# Patient Record
Sex: MALE | Race: WHITE | NOT HISPANIC OR LATINO | Employment: FULL TIME | URBAN - METROPOLITAN AREA
[De-identification: names, ages, dates, MRNs, and addresses within clinical notes are randomized per-mention and may not be internally consistent; named-entity substitution may affect disease eponyms.]

---

## 2022-04-10 ENCOUNTER — HISTORICAL (OUTPATIENT)
Dept: ADMINISTRATIVE | Facility: HOSPITAL | Age: 27
End: 2022-04-10

## 2022-04-26 VITALS
BODY MASS INDEX: 24.42 KG/M2 | SYSTOLIC BLOOD PRESSURE: 136 MMHG | DIASTOLIC BLOOD PRESSURE: 82 MMHG | WEIGHT: 164.88 LBS | OXYGEN SATURATION: 98 % | HEIGHT: 69 IN

## 2022-12-11 ENCOUNTER — OFFICE VISIT (OUTPATIENT)
Dept: URGENT CARE | Facility: CLINIC | Age: 27
End: 2022-12-11
Payer: COMMERCIAL

## 2022-12-11 VITALS
HEIGHT: 70 IN | BODY MASS INDEX: 23.62 KG/M2 | WEIGHT: 165 LBS | TEMPERATURE: 99 F | DIASTOLIC BLOOD PRESSURE: 80 MMHG | RESPIRATION RATE: 17 BRPM | SYSTOLIC BLOOD PRESSURE: 131 MMHG | OXYGEN SATURATION: 100 % | HEART RATE: 86 BPM

## 2022-12-11 DIAGNOSIS — J01.40 ACUTE NON-RECURRENT PANSINUSITIS: Primary | ICD-10-CM

## 2022-12-11 PROCEDURE — 3075F PR MOST RECENT SYSTOLIC BLOOD PRESS GE 130-139MM HG: ICD-10-PCS | Mod: CPTII,,, | Performed by: FAMILY MEDICINE

## 2022-12-11 PROCEDURE — 3079F PR MOST RECENT DIASTOLIC BLOOD PRESSURE 80-89 MM HG: ICD-10-PCS | Mod: CPTII,,, | Performed by: FAMILY MEDICINE

## 2022-12-11 PROCEDURE — 99213 PR OFFICE/OUTPT VISIT, EST, LEVL III, 20-29 MIN: ICD-10-PCS | Mod: ,,, | Performed by: FAMILY MEDICINE

## 2022-12-11 PROCEDURE — 1160F PR REVIEW ALL MEDS BY PRESCRIBER/CLIN PHARMACIST DOCUMENTED: ICD-10-PCS | Mod: CPTII,,, | Performed by: FAMILY MEDICINE

## 2022-12-11 PROCEDURE — 3008F BODY MASS INDEX DOCD: CPT | Mod: CPTII,,, | Performed by: FAMILY MEDICINE

## 2022-12-11 PROCEDURE — 99213 OFFICE O/P EST LOW 20 MIN: CPT | Mod: ,,, | Performed by: FAMILY MEDICINE

## 2022-12-11 PROCEDURE — 1159F PR MEDICATION LIST DOCUMENTED IN MEDICAL RECORD: ICD-10-PCS | Mod: CPTII,,, | Performed by: FAMILY MEDICINE

## 2022-12-11 PROCEDURE — 1160F RVW MEDS BY RX/DR IN RCRD: CPT | Mod: CPTII,,, | Performed by: FAMILY MEDICINE

## 2022-12-11 PROCEDURE — 3075F SYST BP GE 130 - 139MM HG: CPT | Mod: CPTII,,, | Performed by: FAMILY MEDICINE

## 2022-12-11 PROCEDURE — 3079F DIAST BP 80-89 MM HG: CPT | Mod: CPTII,,, | Performed by: FAMILY MEDICINE

## 2022-12-11 PROCEDURE — 1159F MED LIST DOCD IN RCRD: CPT | Mod: CPTII,,, | Performed by: FAMILY MEDICINE

## 2022-12-11 PROCEDURE — 3008F PR BODY MASS INDEX (BMI) DOCUMENTED: ICD-10-PCS | Mod: CPTII,,, | Performed by: FAMILY MEDICINE

## 2022-12-11 RX ORDER — PREDNISONE 20 MG/1
TABLET ORAL
Qty: 8 TABLET | Refills: 0 | Status: SHIPPED | OUTPATIENT
Start: 2022-12-11

## 2022-12-11 RX ORDER — AZITHROMYCIN 250 MG/1
TABLET, FILM COATED ORAL
Qty: 6 TABLET | Refills: 0 | Status: SHIPPED | OUTPATIENT
Start: 2022-12-11

## 2022-12-11 NOTE — PROGRESS NOTES
"Subjective:       Patient ID: Colt Shetty is a 27 y.o. male.    Vitals:  height is 5' 10" (1.778 m) and weight is 74.8 kg (165 lb). His temperature is 98.9 °F (37.2 °C). His blood pressure is 131/80 and his pulse is 86. His respiration is 17 and oxygen saturation is 100%.     Chief Complaint: Cough (X 5 days: Cough, congestion, fatigue /Fever x last night - at home covid test: last night: negative )    HPI:  27-year-old healthy male present to clinic with concerns of nasal congestion, sinus congestion, coughing and fever.  States symptoms started 2 weeks ago and self diagnosis of influenza and over-the-counter medications some help.  Felt better for few days and started with nasal congestion, sinus congestion with green brown mucus since 5 days.  Gradual in onset and worsening.  New onset low-grade fever of 100.3 last night.  No concerns of positive exposure to infections.  Home COVID-19 test has been negative in 2 weeks.  Last and was last night     ROS  :  Constitutional :  Fever, headache, no body aches or chills  Neck : Negative except HPI  HENT : As per HPI  Respiratory : No shortness of breath, no wheezing  Cardiovascular : No chest pain  Musculoskeletal : Negative except HPI  Integumentary : No rash, no abnormal lesion  Neurological : Negative for tingling numbness and weakness   Objective:      Physical Exam    General : Alert and Oriented, No apparent distress, afebrile, sounds stuffy and congested  Neck - supple  HENT : Oropharynx no redness or swelling. Tonsils not enlarged, no exudate.  Bilateral TM intact, left TM appears mild redness and moderate fluid.  Right TM moderate fluid no redness.   Respiratory : Bilateral equal breath sounds, nonlabored respirations  Cardiovascular : Rate, rhythm regular, normal volume pulse, no murmur  Integumentary : Warm, Dry and no rash    Assessment:       1. Acute non-recurrent pansinusitis          Plan:       Cool mist vaporizer to keep there was moist and help " draining sinuses.  Claritin or Allegra for congestion.  Medications as directed.  Prednisone for symptom relief.  Tylenol ibuprofen for sinus headache.  Call or return to clinic for any questions.    Acute non-recurrent pansinusitis  -     predniSONE (DELTASONE) 20 MG tablet; One tablet orally twice daily for 3 days and then once daily for 2 days  Dispense: 8 tablet; Refill: 0  -     azithromycin (Z-HANNAH) 250 MG tablet; Take 2 tablets by mouth on day 1; Take 1 tablet by mouth on days 2-5  Dispense: 6 tablet; Refill: 0

## 2022-12-11 NOTE — PATIENT INSTRUCTIONS
Cool mist vaporizer to keep there was moist and help draining sinuses.  Claritin or Allegra for congestion.  Medications as directed.  Prednisone for symptom relief.  Tylenol ibuprofen for sinus headache.  Call or return to clinic for any questions.

## 2022-12-17 ENCOUNTER — HOSPITAL ENCOUNTER (EMERGENCY)
Facility: HOSPITAL | Age: 27
Discharge: HOME | End: 2022-12-17
Attending: STUDENT IN AN ORGANIZED HEALTH CARE EDUCATION/TRAINING PROGRAM
Payer: COMMERCIAL

## 2022-12-17 VITALS
HEIGHT: 70 IN | WEIGHT: 165 LBS | HEART RATE: 83 BPM | DIASTOLIC BLOOD PRESSURE: 88 MMHG | BODY MASS INDEX: 23.62 KG/M2 | OXYGEN SATURATION: 100 % | RESPIRATION RATE: 19 BRPM | TEMPERATURE: 97 F | SYSTOLIC BLOOD PRESSURE: 147 MMHG
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
## 2022-12-17 PROCEDURE — 99285 EMERGENCY DEPT VISIT HI MDM: CPT | Mod: 25

## 2022-12-17 PROCEDURE — 96372 THER/PROPH/DIAG INJ SC/IM: CPT | Performed by: NURSE PRACTITIONER

## 2022-12-17 PROCEDURE — 63600175 PHARM REV CODE 636 W HCPCS: Performed by: NURSE PRACTITIONER

## 2022-12-17 RX ORDER — KETOROLAC TROMETHAMINE 30 MG/ML
60 INJECTION, SOLUTION INTRAMUSCULAR; INTRAVENOUS
Status: COMPLETED | OUTPATIENT
Start: 2022-12-17 | End: 2022-12-17

## 2022-12-17 RX ORDER — CYCLOBENZAPRINE HCL 10 MG
5 TABLET ORAL 3 TIMES DAILY PRN
Qty: 8 TABLET | Refills: 0 | Status: SHIPPED | OUTPATIENT
Start: 2022-12-17 | End: 2022-12-22

## 2022-12-17 RX ORDER — HYDROCODONE BITARTRATE AND ACETAMINOPHEN 5; 325 MG/1; MG/1
1 TABLET ORAL EVERY 6 HOURS PRN
Qty: 12 TABLET | Refills: 0 | Status: SHIPPED | OUTPATIENT
Start: 2022-12-17 | End: 2022-12-29

## 2022-12-17 RX ORDER — HYDROCODONE BITARTRATE AND ACETAMINOPHEN 5; 325 MG/1; MG/1
1 TABLET ORAL EVERY 6 HOURS PRN
Qty: 12 TABLET | Refills: 0 | Status: SHIPPED | OUTPATIENT
Start: 2022-12-17 | End: 2022-12-17 | Stop reason: SDUPTHER

## 2022-12-17 RX ADMIN — KETOROLAC TROMETHAMINE 60 MG: 30 INJECTION, SOLUTION INTRAMUSCULAR at 07:12
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
## 2023-09-19 ENCOUNTER — OFFICE VISIT (OUTPATIENT)
Dept: URGENT CARE | Facility: CLINIC | Age: 28
End: 2023-09-19
Payer: COMMERCIAL

## 2023-09-19 VITALS
RESPIRATION RATE: 20 BRPM | HEIGHT: 70 IN | HEART RATE: 71 BPM | OXYGEN SATURATION: 100 % | DIASTOLIC BLOOD PRESSURE: 95 MMHG | SYSTOLIC BLOOD PRESSURE: 148 MMHG | WEIGHT: 160 LBS | TEMPERATURE: 98 F | BODY MASS INDEX: 22.9 KG/M2

## 2023-09-19 DIAGNOSIS — R10.31 RIGHT LOWER QUADRANT PAIN: Primary | ICD-10-CM

## 2023-09-19 LAB
BILIRUB UR QL STRIP: NEGATIVE
GLUCOSE UR QL STRIP: NEGATIVE
KETONES UR QL STRIP: NEGATIVE
LEUKOCYTE ESTERASE UR QL STRIP: NEGATIVE
PH, POC UA: 5
POC BLOOD, URINE: NEGATIVE
POC NITRATES, URINE: NEGATIVE
PROT UR QL STRIP: NEGATIVE
SP GR UR STRIP: 1.03 (ref 1–1.03)
UROBILINOGEN UR STRIP-ACNC: 0.3 (ref 0.3–2.2)

## 2023-09-19 PROCEDURE — 99213 OFFICE O/P EST LOW 20 MIN: CPT | Mod: ,,, | Performed by: FAMILY MEDICINE

## 2023-09-19 PROCEDURE — 81003 POCT URINALYSIS, DIPSTICK, AUTOMATED, W/O SCOPE: ICD-10-PCS | Mod: QW,,, | Performed by: FAMILY MEDICINE

## 2023-09-19 PROCEDURE — 81003 URINALYSIS AUTO W/O SCOPE: CPT | Mod: QW,,, | Performed by: FAMILY MEDICINE

## 2023-09-19 PROCEDURE — 99213 PR OFFICE/OUTPT VISIT, EST, LEVL III, 20-29 MIN: ICD-10-PCS | Mod: ,,, | Performed by: FAMILY MEDICINE

## 2023-09-19 NOTE — PROGRESS NOTES
Patient ID: [id number]     Chief Complaint: upper respiratory tract infection symptoms    History of Present Illness:     Colt Shetty is a 28 y.o. male  who presents today for symptoms of Abdominal Pain (Lower right sided abdominal pain, started last night. Denies nausea or any other symptoms)    28-year-old male presents with a 1 day history of right lower quadrant pain.  Yesterday the pain occurred only when he attempted to change positions that involve moving his torso.  Overnight and this morning he began having a baseline achy pain with intermittent sharp pains in the right lower quadrant that are unrelated to movement or body position that appear to occur randomly and lasts for 10-15 seconds before going away entirely.  No fevers, diarrhea, constipation, nausea, or vomiting.  Last bowel movement was this morning which was normal, as well as a normal when yesterday.  No systemic symptoms, no other symptoms.  Pain is a who out of 10 baseline with intermittent sharp pains that go up to 5 or 6.       Past Medical History:     ----------------------------  Asthma     Past Surgical History:   Procedure Laterality Date    WISDOM TOOTH EXTRACTION         Review of patient's allergies indicates:   Allergen Reactions    Loracarbef     Penicillin g benzathine        No outpatient medications have been marked as taking for the 9/19/23 encounter (Office Visit) with Eliot Taveras MD.       Social History     Socioeconomic History    Marital status: Single   Tobacco Use    Smoking status: Some Days     Types: Vaping with nicotine    Smokeless tobacco: Never   Substance and Sexual Activity    Alcohol use: Not Currently     Comment: occ    Drug use: Never        Family History   Problem Relation Age of Onset    No Known Problems Mother     Hypertension Father     No Known Problems Sister     No Known Problems Brother     No Known Problems Other         Subjective:     Review of Systems   Constitutional:  Negative  for chills, fever and malaise/fatigue.   Gastrointestinal:  Positive for abdominal pain. Negative for blood in stool, constipation, diarrhea, heartburn, melena, nausea and vomiting.   Musculoskeletal:  Negative for back pain and falls.   Neurological:  Negative for dizziness, tingling, sensory change, speech change, focal weakness, seizures and loss of consciousness.       Objective:     Vitals:    09/19/23 1317   BP: (!) 148/95   Pulse:    Resp:    Temp:      Body mass index is 22.96 kg/m².    Physical Exam  Constitutional:       General: He is not in acute distress.     Appearance: Normal appearance. He is normal weight. He is not ill-appearing or toxic-appearing.   HENT:      Head: Normocephalic and atraumatic.      Nose: No congestion or rhinorrhea.      Mouth/Throat:      Pharynx: Oropharynx is clear. No oropharyngeal exudate or posterior oropharyngeal erythema.   Eyes:      General:         Right eye: No discharge.         Left eye: No discharge.      Extraocular Movements: Extraocular movements intact.      Conjunctiva/sclera: Conjunctivae normal.   Cardiovascular:      Rate and Rhythm: Normal rate and regular rhythm.      Heart sounds: Normal heart sounds. No murmur heard.     No friction rub. No gallop.   Pulmonary:      Effort: Pulmonary effort is normal. No respiratory distress.      Breath sounds: Normal breath sounds. No stridor. No wheezing, rhonchi or rales.   Chest:      Chest wall: No tenderness.   Abdominal:      General: There is no distension.      Palpations: There is no mass.      Tenderness: There is abdominal tenderness. There is no guarding or rebound.      Comments: 2/10 tenderness at McBurney's point  Tenderness unchanged with rectus engagement  Psoas sign negative  Rovsing sign negative  Obturator sign positive  No guarding or rebound  Bowel sounds normal     Musculoskeletal:         General: No swelling, tenderness, deformity or signs of injury.      Cervical back: No rigidity or  tenderness.   Lymphadenopathy:      Cervical: No cervical adenopathy.   Skin:     Coloration: Skin is not pale.      Findings: No bruising, erythema or rash.   Neurological:      Mental Status: He is alert and oriented to person, place, and time. Mental status is at baseline.   Psychiatric:         Mood and Affect: Mood normal.         Behavior: Behavior normal.         Assessment & Plan:       ICD-10-CM ICD-9-CM   1. Right lower quadrant pain  R10.31 789.03        1. Right lower quadrant pain  -     X-Ray Abdomen Flat And Erect; Future; Expected date: 09/19/2023  -     POCT Urinalysis, Dipstick, Automated, W/O Scope       Patient is not in any acute distress.  Vitals are stable, though blood pressure is mildly elevated which he reports is normal when he goes to the any physician's office and which doses back to 115 over 75 after he leaves.    My review of the x-ray does not show any obvious acute pathology to explain current symptoms, though does show nonspecific bowel gas pattern and feces.  We will await formal radiology report and let patient know.  Urine dipstick is negative for blood.  These test make kidney stones and mechanical bowel problems less likely.    Had a long discussion about possible etiologies, which include appendicitis versus constipation versus musculoskeletal pain versus less likely ureteral stones or some mechanical bowel problem.  Discussed signs and symptoms to monitor for, to go to the ER with any fevers, nausea, vomiting, or increased abdominal pain, and that generally we are watching and waiting.  He will call with any questions

## 2023-09-19 NOTE — PATIENT INSTRUCTIONS
Please go to the emergency room if you develop any fevers, nausea vomiting, or severe abdominal pain    If your abdominal pain remains mild for the next week, please follow-up with your primary care physician for further evaluation    Please call us with any questions you may have

## 2024-01-04 ENCOUNTER — OFFICE VISIT (OUTPATIENT)
Dept: URGENT CARE | Facility: CLINIC | Age: 29
End: 2024-01-04
Payer: COMMERCIAL

## 2024-01-04 VITALS
DIASTOLIC BLOOD PRESSURE: 81 MMHG | RESPIRATION RATE: 20 BRPM | BODY MASS INDEX: 22.9 KG/M2 | OXYGEN SATURATION: 99 % | HEIGHT: 70 IN | WEIGHT: 160 LBS | TEMPERATURE: 98 F | HEART RATE: 85 BPM | SYSTOLIC BLOOD PRESSURE: 124 MMHG

## 2024-01-04 DIAGNOSIS — J10.1 INFLUENZA A: Primary | ICD-10-CM

## 2024-01-04 DIAGNOSIS — R09.81 SINUS CONGESTION: ICD-10-CM

## 2024-01-04 LAB
CTP QC/QA: YES
POC MOLECULAR INFLUENZA A AGN: POSITIVE
POC MOLECULAR INFLUENZA B AGN: NEGATIVE

## 2024-01-04 PROCEDURE — 99213 OFFICE O/P EST LOW 20 MIN: CPT | Mod: ,,,

## 2024-01-04 PROCEDURE — 87502 INFLUENZA DNA AMP PROBE: CPT | Mod: QW,,,

## 2024-01-04 RX ORDER — OSELTAMIVIR PHOSPHATE 75 MG/1
75 CAPSULE ORAL 2 TIMES DAILY
Qty: 10 CAPSULE | Refills: 0 | Status: SHIPPED | OUTPATIENT
Start: 2024-01-04 | End: 2024-01-09

## 2024-01-04 NOTE — PROGRESS NOTES
"Subjective:      Patient ID: Colt Shetty is a 28 y.o. male.    Vitals:  height is 5' 10" (1.778 m) and weight is 72.6 kg (160 lb). His temperature is 98 °F (36.7 °C). His blood pressure is 124/81 and his pulse is 85. His respiration is 20 and oxygen saturation is 99%.     Chief Complaint: Sinus Problem (Pt symptoms started on 1/1/24 with runny nose, sinus congestion, fever, body aches, chills, and sweating.  )    Patient is a 28-year-old male that presents complaining of runny nose, congestion, body aches, chills, sweats x2-3 days. Patient denies any SOB, CP, rash, n/v/d, or neck stiffness.          Constitution: Positive for chills, sweating and fever.   HENT:  Positive for congestion.       Objective:     Physical Exam   Constitutional: He is oriented to person, place, and time. He appears well-developed. He is cooperative.  Non-toxic appearance. He does not appear ill. No distress.   HENT:   Head: Normocephalic and atraumatic.   Ears:   Right Ear: Hearing, tympanic membrane, external ear and ear canal normal.   Left Ear: Hearing, tympanic membrane, external ear and ear canal normal.   Nose: Congestion present.   Mouth/Throat: Uvula is midline and mucous membranes are normal. Mucous membranes are moist. No trismus in the jaw. Normal dentition. No uvula swelling. No oropharyngeal exudate, posterior oropharyngeal edema or posterior oropharyngeal erythema. Oropharynx is clear.   Eyes: Conjunctivae and lids are normal. Right conjunctiva is not injected. Left conjunctiva is not injected.   Neck: Neck supple. No edema present. No erythema present. No neck rigidity present.   Cardiovascular: Normal rate, regular rhythm, normal heart sounds and normal pulses.   Pulmonary/Chest: Effort normal and breath sounds normal. No respiratory distress. He has no decreased breath sounds. He has no rhonchi.   Abdominal: Normal appearance and bowel sounds are normal. There is no abdominal tenderness.   Neurological: He is alert and " "oriented to person, place, and time. He exhibits normal muscle tone. Coordination normal.   Skin: Skin is warm, intact, not diaphoretic and no rash.   Psychiatric: His speech is normal and behavior is normal. Mood, judgment and thought content normal.   Nursing note and vitals reviewed.      Assessment:     1. Influenza A    2. Sinus congestion           Previous History      Review of patient's allergies indicates:   Allergen Reactions    Loracarbef     Penicillin g benzathine        Past Medical History:   Diagnosis Date    Asthma      Current Outpatient Medications   Medication Instructions    azithromycin (Z-HANNAH) 250 MG tablet Take 2 tablets by mouth on day 1; Take 1 tablet by mouth on days 2-5<BR>    oseltamivir (TAMIFLU) 75 mg, Oral, 2 times daily    predniSONE (DELTASONE) 20 MG tablet One tablet orally twice daily for 3 days and then once daily for 2 days     Past Surgical History:   Procedure Laterality Date    WISDOM TOOTH EXTRACTION       Family History   Problem Relation Age of Onset    No Known Problems Mother     Hypertension Father     No Known Problems Sister     No Known Problems Brother     No Known Problems Other        Social History     Tobacco Use    Smoking status: Former     Types: Vaping with nicotine    Smokeless tobacco: Never   Substance Use Topics    Alcohol use: Yes    Drug use: Never        Physical Exam      Vital Signs Reviewed   /81   Pulse 85   Temp 98 °F (36.7 °C)   Resp 20   Ht 5' 10" (1.778 m)   Wt 72.6 kg (160 lb)   SpO2 99%   BMI 22.96 kg/m²        Procedures    Procedures     Labs     Results for orders placed or performed in visit on 01/04/24   POCT Influenza A/B MOLECULAR   Result Value Ref Range    POC Molecular Influenza A Ag Positive (A) Negative, Not Reported    POC Molecular Influenza B Ag Negative Negative, Not Reported     Acceptable Yes       Plan:       Influenza A  -     oseltamivir (TAMIFLU) 75 MG capsule; Take 1 capsule (75 mg total) " by mouth 2 (two) times daily. for 5 days  Dispense: 10 capsule; Refill: 0    Sinus congestion  -     POCT Influenza A/B MOLECULAR    Drink plenty of fluids.     Tylenol every 4 hours, Motrin every 6 as needed for fever.    Flu medication to help decrease symptoms/duration    Take OTC Decongestants  (Claritin D/sudafed OR Coricidin for people with HTN) to cut down on the fluid in the lining of your nose and relieve swollen nasal passages and congestion. May also use cough/cold/congestion medication such as Dayquil/Nyquil and/or antihistamine such as Claritin/Zyrtec/Allegra.  Cepacol sore throat lozenges/spray if needed.     Patient is contagious for 5 days from symptom onset.     Go to ER with any shortness of breath or other worrisome symptoms.

## 2024-01-04 NOTE — PATIENT INSTRUCTIONS
Drink plenty of fluids.     Tylenol every 4 hours, Motrin every 6 as needed for fever.    Flu medication to help decrease symptoms/duration    Take OTC Decongestants  (Claritin D/sudafed OR Coricidin for people with HTN) to cut down on the fluid in the lining of your nose and relieve swollen nasal passages and congestion. May also use cough/cold/congestion medication such as Dayquil/Nyquil and/or antihistamine such as Claritin/Zyrtec/Allegra.  Cepacol sore throat lozenges/spray if needed.     Patient is contagious for 5 days from symptom onset.     Go to ER with any shortness of breath or other worrisome symptoms.